# Patient Record
Sex: MALE | Race: WHITE | Employment: OTHER | URBAN - METROPOLITAN AREA
[De-identification: names, ages, dates, MRNs, and addresses within clinical notes are randomized per-mention and may not be internally consistent; named-entity substitution may affect disease eponyms.]

---

## 2017-07-24 ENCOUNTER — DOCUMENTATION ONLY (OUTPATIENT)
Dept: FAMILY MEDICINE CLINIC | Age: 77
End: 2017-07-24

## 2017-07-24 ENCOUNTER — OFFICE VISIT (OUTPATIENT)
Dept: FAMILY MEDICINE CLINIC | Age: 77
End: 2017-07-24

## 2017-07-24 VITALS
WEIGHT: 239.2 LBS | SYSTOLIC BLOOD PRESSURE: 138 MMHG | TEMPERATURE: 96.2 F | RESPIRATION RATE: 20 BRPM | BODY MASS INDEX: 35.43 KG/M2 | DIASTOLIC BLOOD PRESSURE: 84 MMHG | HEIGHT: 69 IN | HEART RATE: 64 BPM | OXYGEN SATURATION: 98 %

## 2017-07-24 DIAGNOSIS — R35.0 URINARY FREQUENCY: ICD-10-CM

## 2017-07-24 DIAGNOSIS — E66.01 MORBID OBESITY, UNSPECIFIED OBESITY TYPE (HCC): ICD-10-CM

## 2017-07-24 DIAGNOSIS — M54.50 ACUTE LOW BACK PAIN WITHOUT SCIATICA, UNSPECIFIED BACK PAIN LATERALITY: Primary | ICD-10-CM

## 2017-07-24 DIAGNOSIS — Z13.1 ENCOUNTER FOR SCREENING FOR DIABETES MELLITUS: ICD-10-CM

## 2017-07-24 LAB
BILIRUB UR QL STRIP: NEGATIVE
GLUCOSE DOSE-GTT, POCT, GLDSPOCT: 79
GLUCOSE UR-MCNC: NEGATIVE MG/DL
KETONES P FAST UR STRIP-MCNC: NEGATIVE MG/DL
PH UR STRIP: 6.5 [PH] (ref 4.6–8)
PROT UR QL STRIP: NEGATIVE MG/DL
SP GR UR STRIP: 1.02 (ref 1–1.03)
UA UROBILINOGEN AMB POC: NORMAL (ref 0.2–1)
URINALYSIS CLARITY POC: CLEAR
URINALYSIS COLOR POC: NORMAL
URINE BLOOD POC: NEGATIVE
URINE LEUKOCYTES POC: NEGATIVE
URINE NITRITES POC: NEGATIVE

## 2017-07-24 RX ORDER — METHYLPREDNISOLONE 16 MG/1
16 TABLET ORAL
COMMUNITY
Start: 2016-05-03 | End: 2022-07-27

## 2017-07-24 RX ORDER — ASPIRIN 81 MG/1
81 TABLET ORAL
COMMUNITY
Start: 2014-05-06

## 2017-07-24 RX ORDER — POLYETHYLENE GLYCOL 3350 17 G/17G
POWDER, FOR SOLUTION ORAL
COMMUNITY
Start: 2015-08-25 | End: 2022-07-27

## 2017-07-24 RX ORDER — LEVOTHYROXINE SODIUM 125 UG/1
TABLET ORAL
COMMUNITY
Start: 2017-07-19

## 2017-07-24 RX ORDER — VALACYCLOVIR HYDROCHLORIDE 500 MG/1
500 TABLET, FILM COATED ORAL
COMMUNITY
Start: 2016-05-03

## 2017-07-24 RX ORDER — BISMUTH SUBSALICYLATE 262 MG
1 TABLET,CHEWABLE ORAL
COMMUNITY

## 2017-07-24 NOTE — MR AVS SNAPSHOT
Visit Information Date & Time Provider Department Dept. Phone Encounter #  
 7/24/2017  1:00 PM Hayden Mclaughlin, [address] [phone] [id number] Follow-up Instructions Return in about 6 months (around 1/24/2018) for pending chart review. Follow-up and Disposition History Your Appointments 8/24/2017  1:00 PM  
Office Visit with Hayden Mclaughlin MD  
[address] [facility]) Appt Note: [address]   
 [facility] [address] [address]  
[phone]  
  
   
 [facility] [address] [address] Upcoming Health Maintenance Date Due DTaP/Tdap/Td series (1 - Tdap) 11/14/1963 FOBT Q 1 YEAR AGE 50-75 11/14/1992 ZOSTER VACCINE AGE 60> 9/14/2002 GLAUCOMA SCREENING Q2Y 11/14/2007 Pneumococcal 65+ Low/Medium Risk (1 of 2 - PCV13) 11/14/2007 MEDICARE YEARLY EXAM 11/14/2007 INFLUENZA AGE 9 TO ADULT 8/1/2017 Allergies as of 7/24/2017  Review Complete On: 7/24/2017 By: Jayne Mason LPN Severity Noted Reaction Type Reactions Niacin  01/11/2009    Other (comments) Other reaction(s): rash/hot flash Current Immunizations  Never Reviewed No immunizations on file. Not reviewed this visit You Were Diagnosed With   
  
 Codes Comments Acute low back pain without sciatica, unspecified back pain laterality    -  Primary ICD-10-CM: M54.5 ICD-9-CM: 724.2 Urinary frequency     ICD-10-CM: R35.0 ICD-9-CM: 788.41 Morbid obesity, unspecified obesity type ([facility] [address]     ICD-10-CM: E66.01 
ICD-9-CM: 278.01 Encounter for screening for diabetes mellitus     ICD-10-CM: Z13.1 ICD-9-CM: V77.1 Vitals BP Pulse Temp Resp Height(growth percentile) Weight(growth percentile) 138/84 64 96.2 °F (35.7 °C) (Oral) 20 5' 8.5\" (1.74 m) 239 lb 3.2 oz (108.5 kg) SpO2 BMI Smoking Status 98% 35.84 kg/m2 Former Smoker Vitals History BMI and BSA Data Body Mass Index Body Surface Area  
 35.84 kg/m 2 2.29 m 2 Your Updated Medication List  
  
   
This list is accurate as of: 7/24/17 11:59 PM.  Always use your most recent med list.  
  
  
  
  
 aspirin delayed-release 81 mg tablet Take 81 mg by mouth. CALTRATE 600 PO Take 600 mg by mouth.  
  
 methylPREDNISolone 16 mg tablet Commonly known as:  MEDROL Take 16 mg by mouth.  
  
 multivitamin tablet Commonly known as:  ONE A DAY Take 1 Tab by mouth.  
  
 polyethylene glycol 17 gram/dose powder Commonly known as:  Almeta Lauth Take  by mouth. SYNTHROID 125 mcg tablet Generic drug:  levothyroxine Indications: Underactive Thyroid. 1 PO once a day SYSTANE ULTRA OP Apply  to eye. TYLENOL EXTRA STRENGTH PO Take 1 Tab by mouth. valACYclovir 500 mg tablet Commonly known as:  VALTREX Take 500 mg by mouth. We Performed the Following AMB POC GLUCOSE TEST [58171 CPT(R)] AMB POC URINALYSIS DIP STICK AUTO W/ MICRO [63314 CPT(R)] AMB POC URINALYSIS DIP STICK MANUAL W/O MICRO [50195 CPT(R)] Follow-up Instructions Return in about 6 months (around 1/24/2018) for pending chart review. Patient Instructions Frequent Urination: Care Instructions Your Care Instructions An urge to urinate frequently but usually passing only small amounts of urine is a common symptom of urinary problems, such as urinary tract infections. The bladder may become inflamed. This can cause the urge to urinate. You may try to urinate more often than usual to try to soothe that urge. Frequent urination also may be caused by sexually transmitted infections (STIs) or kidney stones. Or it may happen when something irritates the tube that carries urine from the bladder to the outside of the body (urethra). It may also be a sign of diabetes. The cause may be hard to find. You may need tests. Follow-up care is a key part of your treatment and safety. Be sure to make and go to all appointments, and call your doctor if you are having problems. It's also a good idea to know your test results and keep a list of the medicines you take. How can you care for yourself at home? · Drink extra water for the next day or two. This will help make the urine less concentrated. (If you have kidney, heart, or liver disease and have to limit fluids, talk with your doctor before you increase the amount of fluids you drink.) · Avoid drinks that are carbonated or have caffeine. They can irritate the bladder. For women: · Urinate right after you have sex. · After you go to the bathroom, wipe from front to back. · Avoid douches, bubble baths, and feminine hygiene sprays. And avoid other feminine hygiene products that have deodorants. When should you call for help? Call your doctor now or seek immediate medical care if: 
· You have new symptoms, such as fever, nausea, or vomiting. · You have new or worse symptoms of a urinary problem. For example: ¨ You have blood or pus in your urine. ¨ You have chills or body aches. ¨ It hurts to urinate. ¨ You have groin or belly pain. ¨ You have pain in your back just below your rib cage (the flank area). Watch closely for changes in your health, and be sure to contact your doctor if you feel thirstier than usual. 
Where can you learn more? Go to http://marga-radha.info/. Enter 863 [id number] in the search box to learn more about \"Frequent Urination: Care Instructions. \" Current as of: May 5, 2017 Content Version: 11.3 © 7137-5683 [employer]. Care instructions adapted under license by [employer] (which disclaims liability or warranty for this information).  If you have questions about a medical condition or this instruction, always ask your healthcare professional. [employer] disclaims any warranty or liability for your use of this information. Introducing [facility]! Erinn Keen introduces FRAMED patient portal. Now you can access parts of your medical record, email your doctor's office, and request medication refills online. 1. In your internet browser, go to [email or web address] Jack Robie/"[employer]"t 2. Click on the First Time User? Click Here link in the Sign In box. You will see the New Member Sign Up page. 3. Enter your FRAMED Access Code exactly as it appears below. You will not need to use this code after youve completed the sign-up process. If you do not sign up before the expiration date, you must request a new code. · FRAMED Access Code: [email or web address] Expires: 10/29/2017  2:39 PM 
 
4. Enter the last four digits of your Social Security Number (xxxx) and Date of Birth (mm/dd/yyyy) as indicated and click Submit. You will be taken to the next sign-up page. 5. Create a FRAMED ID. This will be your FRAMED login ID and cannot be changed, so think of one that is secure and easy to remember. 6. Create a FRAMED password. You can change your password at any time. 7. Enter your Password Reset Question and Answer. This can be used at a later time if you forget your password. 8. Enter your e-mail address. You will receive e-mail notification when new information is available in [address] 9. Click Sign Up. You can now view and download portions of your medical record. 10. Click the Download Summary menu link to download a portable copy of your medical information. If you have questions, please visit the Frequently Asked Questions section of the FRAMED website. Remember, FRAMED is NOT to be used for urgent needs. For medical emergencies, dial 911. Now available from your iPhone and Android! Please provide this summary of care documentation to your next provider. Your primary care clinician is listed as Yonas Junior. If you have any questions after today's visit, please call [phone].

## 2017-07-24 NOTE — PROGRESS NOTES
Stacia Amato is a 78 y.o. male in today to establish care. Patient has no concerns at this time, but mentions ongoing back pain and numbness and tingling of right leg intermittently. Learning assessment previously completed; primary language is [place].

## 2017-07-24 NOTE — PATIENT INSTRUCTIONS
Frequent Urination: Care Instructions  Your Care Instructions  An urge to urinate frequently but usually passing only small amounts of urine is a common symptom of urinary problems, such as urinary tract infections. The bladder may become inflamed. This can cause the urge to urinate. You may try to urinate more often than usual to try to soothe that urge. Frequent urination also may be caused by sexually transmitted infections (STIs) or kidney stones. Or it may happen when something irritates the tube that carries urine from the bladder to the outside of the body (urethra). It may also be a sign of diabetes. The cause may be hard to find. You may need tests. Follow-up care is a key part of your treatment and safety. Be sure to make and go to all appointments, and call your doctor if you are having problems. It's also a good idea to know your test results and keep a list of the medicines you take. How can you care for yourself at home? · Drink extra water for the next day or two. This will help make the urine less concentrated. (If you have kidney, heart, or liver disease and have to limit fluids, talk with your doctor before you increase the amount of fluids you drink.)  · Avoid drinks that are carbonated or have caffeine. They can irritate the bladder. For women:  · Urinate right after you have sex. · After you go to the bathroom, wipe from front to back. · Avoid douches, bubble baths, and feminine hygiene sprays. And avoid other feminine hygiene products that have deodorants. When should you call for help? Call your doctor now or seek immediate medical care if:  · You have new symptoms, such as fever, nausea, or vomiting. · You have new or worse symptoms of a urinary problem. For example:  ¨ You have blood or pus in your urine. ¨ You have chills or body aches. ¨ It hurts to urinate. ¨ You have groin or belly pain. ¨ You have pain in your back just below your rib cage (the flank area).   Watch closely for changes in your health, and be sure to contact your doctor if you feel thirstier than usual.  Where can you learn more? Go to http://marga-radha.info/. Enter [phone] in the search box to learn more about \"Frequent Urination: Care Instructions. \"  Current as of: May 5, 2017  Content Version: 11.3  © 5391-0808 [employer]. Care instructions adapted under license by [employer] (which disclaims liability or warranty for this information). If you have questions about a medical condition or this instruction, always ask your healthcare professional. [place] [address] any warranty or liability for your use of this information.

## 2017-07-24 NOTE — PROGRESS NOTES
Establish Care        HPI: Bobby Josue is a 78 y.o. male WHITE OR  new patient here with hx of MUltiple Myeloma. Sees Dr Karl Ashton, oncology. Reports that he has been having some lower back pain over the last month as well as increased urinary frequency. He has a strong family hx of DM. He has not had recent screening. He had a recent bonescan by his oncologist that he reports was normal.            Past Medical History:   Diagnosis Date    Arthritis     Back pain     Calculus of kidney     Multiple myeloma ([facility] [address] 2010       Current Outpatient Prescriptions   Medication Sig    methylPREDNISolone (MEDROL) 16 mg tablet Take 16 mg by mouth.  valACYclovir (VALTREX) 500 mg tablet Take 500 mg by mouth.  aspirin delayed-release 81 mg tablet Take 81 mg by mouth.  CALCIUM CARBONATE (CALTRATE 600 PO) Take 600 mg by mouth.  polyethylene glycol (MIRALAX) 17 gram/dose powder Take  by mouth.  levothyroxine (SYNTHROID) 125 mcg tablet Indications: Underactive Thyroid. 1 PO once a day    ACETAMINOPHEN (TYLENOL EXTRA STRENGTH PO) Take 1 Tab by mouth.  multivitamin (ONE A DAY) tablet Take 1 Tab by mouth.  PROPYLENE GLYCOL/ (SYSTANE ULTRA OP) Apply  to eye. No current facility-administered medications for this visit. Allergies   Allergen Reactions    Niacin Other (comments)     Other reaction(s): rash/hot flash       Past Surgical History:   Procedure Laterality Date    HX CATARACT REMOVAL Bilateral 2017    HX THYROIDECTOMY         Family History   Problem Relation Age of Onset    Heart Disease Father     Heart Attack Father     Arthritis-osteo Sister     Asthma Maternal Grandmother     Bleeding Prob Maternal Grandmother     Lung Disease Sister        Social History     Social History    Marital status:      Spouse name: N/A    Number of children: N/A    Years of education: N/A     Occupational History    Not on file.      Social History Main Topics    Smoking status: Former Smoker     Types: Cigarettes     Quit date: 1/1/1983    Smokeless tobacco: Never Used    Alcohol use No    Drug use: No    Sexual activity: Not on file     Other Topics Concern    Not on file     Social History Narrative    No narrative on file       Review of Systems   Constitutional: Negative for chills, fever and weight loss. Respiratory: Negative for cough and shortness of breath. Cardiovascular: Negative for chest pain and palpitations. Genitourinary: Positive for frequency. Negative for dysuria, flank pain, hematuria and urgency. Musculoskeletal: Positive for back pain. Skin: Negative for rash. Visit Vitals    /84    Pulse 64    Temp 96.2 °F (35.7 °C) (Oral)    Resp 20    Ht 5' 8.5\" (1.74 m)    Wt 239 lb 3.2 oz (108.5 kg)    SpO2 98%    BMI 35.84 kg/m2       Physical Exam   Constitutional: He is oriented to person, place, and time. He appears well-developed and well-nourished. No distress. HENT:   Head: Normocephalic and atraumatic. Right Ear: External ear normal.   Left Ear: External ear normal.   Cardiovascular: Normal rate, regular rhythm and normal heart sounds. Exam reveals no friction rub. No murmur heard. Pulmonary/Chest: Effort normal and breath sounds normal. No respiratory distress. He has no wheezes. He has no rales. Musculoskeletal:        Lumbar back: He exhibits no tenderness, no bony tenderness and no spasm. Neurological: He is alert and oriented to person, place, and time. No cranial nerve deficit. Skin: Skin is warm and dry. He is not diaphoretic. Psychiatric: He has a normal mood and affect. His behavior is normal. Thought content normal.   Nursing note and vitals reviewed.     Results for orders placed or performed in visit on 07/24/17   AMB POC URINALYSIS DIP STICK AUTO W/ MICRO   Result Value Ref Range    Color (UA POC) Dark Yellow     Clarity (UA POC) Clear     Glucose (UA POC) Negative Negative    Bilirubin (UA POC) Negative Negative    Ketones (UA POC) Negative Negative    Specific gravity (UA POC) 1.020 1.001 - 1.035    Blood (UA POC) Negative Negative    pH (UA POC) 6.5 4.6 - 8.0    Protein (UA POC) Negative Negative mg/dL    Urobilinogen (UA POC) 0.2 mg/dL 0.2 - 1    Nitrites (UA POC) Negative Negative    Leukocyte esterase (UA POC) Negative Negative   AMB POC GLUCOSE TEST   Result Value Ref Range    Glucose dose-GTT 79            Assesment:  1. Acute low back pain without sciatica, unspecified back pain laterality  Likely muscle strain. Tylenol PRN and stretching exercises  - AMB POC URINALYSIS DIP STICK AUTO W/ MICRO    2. Urinary frequency    - AMB POC GLUCOSE TEST    3. Morbid obesity, unspecified obesity type ([address]  I have reviewed/discussed the above normal BMI with the patient. I have recommended the following interventions: encourage exercise . Edinson Stewart - AMB POC GLUCOSE TEST    4. Encounter for screening for diabetes mellitus     - AMB POC GLUCOSE TEST          I have discussed the diagnosis with the patient and the intended management  The patient has received an after-visit summary and questions were answered concerning future plans. I have discussed medication usage, side effects and warnings with the patient as well. I have reviewed the plan of care with the patient, accepted their input and they are in agreement with the treatment goals. Follow-up Disposition:  Return in about 6 months (around 1/24/2018) for pending chart review.       Gisela Castellanos MD                .

## 2017-08-24 ENCOUNTER — OFFICE VISIT (OUTPATIENT)
Dept: FAMILY MEDICINE CLINIC | Age: 77
End: 2017-08-24

## 2017-08-24 ENCOUNTER — TELEPHONE (OUTPATIENT)
Dept: FAMILY MEDICINE CLINIC | Age: 77
End: 2017-08-24

## 2017-08-24 VITALS
HEART RATE: 67 BPM | WEIGHT: 236.6 LBS | OXYGEN SATURATION: 97 % | DIASTOLIC BLOOD PRESSURE: 90 MMHG | BODY MASS INDEX: 35.04 KG/M2 | RESPIRATION RATE: 18 BRPM | HEIGHT: 69 IN | TEMPERATURE: 98.6 F | SYSTOLIC BLOOD PRESSURE: 138 MMHG

## 2017-08-24 DIAGNOSIS — Z13.39 SCREENING FOR ALCOHOLISM: ICD-10-CM

## 2017-08-24 DIAGNOSIS — Z13.31 SCREENING FOR DEPRESSION: ICD-10-CM

## 2017-08-24 DIAGNOSIS — Z00.00 INITIAL MEDICARE ANNUAL WELLNESS VISIT: Primary | ICD-10-CM

## 2017-08-24 DIAGNOSIS — R09.82 POSTNASAL DRIP: ICD-10-CM

## 2017-08-24 DIAGNOSIS — R09.81 NASAL CONGESTION: ICD-10-CM

## 2017-08-24 DIAGNOSIS — Z23 ENCOUNTER FOR IMMUNIZATION: ICD-10-CM

## 2017-08-24 PROBLEM — G47.33 OSA (OBSTRUCTIVE SLEEP APNEA): Status: ACTIVE | Noted: 2017-08-24

## 2017-08-24 PROBLEM — C90.01 MULTIPLE MYELOMA IN REMISSION (HCC): Status: ACTIVE | Noted: 2017-08-24

## 2017-08-24 NOTE — TELEPHONE ENCOUNTER
Dr. Anne-Marie Badillo would like to know if Dr. Katherine Osullivan has any recommendations on Pneumovax for the pt since he is on chemo; For instance, every 5 years? Left message on the nurse line with this question. Waiting for a call back.

## 2017-08-24 NOTE — PATIENT INSTRUCTIONS

## 2017-08-24 NOTE — PROGRESS NOTES
This is an Initial Medicare Annual Wellness Exam (AWV) (Performed 12 months after IPPE or effective date of Medicare Part B enrollment, Once in a lifetime)    I have reviewed the patient's medical history in detail and updated the computerized patient record. History   Having nasal congestion and postnasal drip. Not too concerned with this. Also having some joint pains, secondary to arthritis but this too is nothing he is concerned with. Past Medical History:   Diagnosis Date    Arthritis     Back pain     Calculus of kidney     Multiple myeloma ([facility] [address] 2010      Past Surgical History:   Procedure Laterality Date    HX CATARACT REMOVAL Bilateral 2017    HX THYROIDECTOMY       Current Outpatient Prescriptions   Medication Sig Dispense Refill    methylPREDNISolone (MEDROL) 16 mg tablet Take 16 mg by mouth.  valACYclovir (VALTREX) 500 mg tablet Take 500 mg by mouth three (3) days a week.  aspirin delayed-release 81 mg tablet Take 81 mg by mouth.  CALCIUM CARBONATE (CALTRATE 600 PO) Take 600 mg by mouth.  multivitamin (ONE A DAY) tablet Take 1 Tab by mouth.  PROPYLENE GLYCOL/ (SYSTANE ULTRA OP) Apply  to eye.  polyethylene glycol (MIRALAX) 17 gram/dose powder Take  by mouth.  levothyroxine (SYNTHROID) 125 mcg tablet Indications: Underactive Thyroid. 1 PO once a day      ACETAMINOPHEN (TYLENOL EXTRA STRENGTH PO) Take 1 Tab by mouth.        Allergies   Allergen Reactions    Niacin Other (comments)     Other reaction(s): rash/hot flash     Family History   Problem Relation Age of Onset    Heart Disease Father     Heart Attack Father     Arthritis-osteo Sister     Asthma Maternal Grandmother     Bleeding Prob Maternal Grandmother     Lung Disease Sister      Social History   Substance Use Topics    Smoking status: Former Smoker     Types: Cigarettes     Quit date: 1/1/1983    Smokeless tobacco: Never Used    Alcohol use No     Patient Active Problem List Diagnosis Code    Acute low back pain without sciatica M54.5    Urinary frequency R35.0       Depression Risk Factor Screening:     PHQ over the last two weeks 7/24/2017   Little interest or pleasure in doing things Not at all   Feeling down, depressed or hopeless Not at all   Total Score PHQ 2 0     Alcohol Risk Factor Screening: You do not drink alcohol or very rarely. Functional Ability and Level of Safety:     Hearing Loss  The patient wears hearing aids. Activities of Daily Living  The home contains: no safety equipment  Patient does total self care    Fall RiskFall Risk Assessment, last 12 mths 8/24/2017   Able to walk? Yes   Fall in past 12 months? Yes   Fall with injury? Yes   Number of falls in past 12 months 1   Fall Risk Score 2       Abuse Screen  Patient is not abused    Cognitive Screening   Evaluation of Cognitive Function:  Has your family/caregiver stated any concerns about your memory: no  Normal    Patient Care Team   Patient Care Team:  Saji Enriquez MD as PCP - General (Family Practice)  Nia Lantigua MD as Physician [facility])    Advice/Referrals/Counseling   Education and counseling provided:  Are appropriate based on today's review and evaluation  End-of-Life planning (with patient's consent)  Influenza Vaccine     Advance Care Planning (ACP) Provider Note - Comprehensive     Date of ACP Conversation: 8/24/2017  Persons included in Conversation:  patient  Length of ACP Conversation in minutes:  16 minutes    Authorized Decision Maker (if patient is incapable of making informed decisions):    This person is:  Healthcare Agent/Medical Power of  under Advance Directive          General ACP for ALL Patients with Decision Making Capacity:   Importance of advance care planning, including choosing a healthcare agent to communicate patient's healthcare decisions if patient lost the ability to make decisions, such as after a sudden illness or accident    Review of Existing Advance Directive:  N/A.  given application previously. he reports he does have it at home    For Serious or Chronic Illness:  Understanding of medical condition    Understanding of CPR, goals and expected outcomes, benefits and burdens discussed. Interventions Provided:  Recommended completion of Advance Directive form after review of ACP materials and conversation with prospective healthcare agent       Assessment/Plan   1. Initial Medicare annual wellness visit      2. Screening for depression    - Depression Screen Annual    3. Screening for alcoholism    - Annual  Alcohol Screen 15 min ()    4. Nasal congestion  Patient states not really bothering him much. Recommended antihistamine    5. Postnasal drip      6. Encounter for immunization    - INFLUENZA VIRUS VACCINE, HIGH DOSE SEASONAL, PRESERVATIVE FREE    Will check with oncologist as he likely needs pneumonia vaccination.       Health Maintenance Due   Topic Date Due    DTaP/Tdap/Td series (1 - Tdap) 11/14/1963    FOBT Q 1 YEAR AGE 50-75  11/14/1992    ZOSTER VACCINE AGE 60>  09/14/2002    GLAUCOMA SCREENING Q2Y  11/14/2007    Pneumococcal 65+ Low/Medium Risk (1 of 2 - PCV13) 11/14/2007    MEDICARE YEARLY EXAM  11/14/2007    INFLUENZA AGE 9 TO ADULT  08/01/2017

## 2017-08-24 NOTE — PROGRESS NOTES
Candida Jones is a 78 y.o. male here for [address] He is c/o L hip pain, no injury. PHQ over the last two weeks 7/24/2017   Little interest or pleasure in doing things Not at all   Feeling down, depressed or hopeless Not at all   Total Score PHQ 2 0     Fall Risk Assessment, last 12 mths 8/24/2017   Able to walk? Yes   Fall in past 12 months? Yes   Fall with injury? Yes   Number of falls in past 12 months 1   Fall Risk Score 2     Abuse Screening Questionnaire 8/24/2017   Do you ever feel afraid of your partner? N   Are you in a relationship with someone who physically or mentally threatens you? N   Is it safe for you to go home?  Y     ADL Assessment 8/24/2017   Feeding yourself No Help Needed   Getting from bed to chair No Help Needed   Getting dressed No Help Needed   Bathing or showering No Help Needed   Walk across the room (includes cane/walker) No Help Needed   Using the telphone No Help Needed   Taking your medications No Help Needed   Preparing meals No Help Needed   Managing money (expenses/bills) No Help Needed   Moderately strenuous housework (laundry) No Help Needed   Shopping for personal items (toiletries/medicines) No Help Needed   Shopping for groceries No Help Needed   Driving No Help Needed   Climbing a flight of stairs No Help Needed   Getting to places beyond walking distances No Help Needed

## 2017-08-25 NOTE — TELEPHONE ENCOUNTER
Return call from Dr. Quintero Postal office. They would like for pt to get Pneumovax every 5 years. Will relay this info to Dr. Karli Fraga.

## 2017-08-28 NOTE — TELEPHONE ENCOUNTER
Can you please call Mr Scott Jimenez and let him know he should come in for Pnuemovax 23 please.     Thanks,  [phone] Rico MD

## 2017-08-29 NOTE — TELEPHONE ENCOUNTER
Patient states he recently got Pneumovax from [facility] before coming here. [facility] him records have been requested and we will check into this.

## 2017-09-01 NOTE — PROGRESS NOTES
ADDENDUM TO CODIN is incorrect for procedure performed. [place] 0[address]. Updating billing in [place] for correct procedure code per equipment used at the practice.

## 2018-01-08 ENCOUNTER — OFFICE VISIT (OUTPATIENT)
Dept: FAMILY MEDICINE CLINIC | Age: 78
End: 2018-01-08

## 2018-01-08 VITALS
HEIGHT: 69 IN | RESPIRATION RATE: 18 BRPM | WEIGHT: 238.2 LBS | DIASTOLIC BLOOD PRESSURE: 85 MMHG | HEART RATE: 106 BPM | TEMPERATURE: 98.8 F | SYSTOLIC BLOOD PRESSURE: 144 MMHG | BODY MASS INDEX: 35.28 KG/M2 | OXYGEN SATURATION: 95 %

## 2018-01-08 DIAGNOSIS — J06.9 UPPER RESPIRATORY TRACT INFECTION, UNSPECIFIED TYPE: Primary | ICD-10-CM

## 2018-01-08 DIAGNOSIS — R06.2 WHEEZING: ICD-10-CM

## 2018-01-08 RX ORDER — BENZONATATE 100 MG/1
100 CAPSULE ORAL
Qty: 20 CAP | Refills: 0 | Status: SHIPPED | OUTPATIENT
Start: 2018-01-08 | End: 2018-01-15

## 2018-01-08 RX ORDER — GUAIFENESIN 600 MG/1
600 TABLET, EXTENDED RELEASE ORAL 2 TIMES DAILY
Qty: 30 TAB | Refills: 0 | Status: SHIPPED | OUTPATIENT
Start: 2018-01-08 | End: 2022-07-27

## 2018-01-08 NOTE — MR AVS SNAPSHOT
Visit Information Date & Time Provider Department Dept. Phone Encounter #  
 1/8/2018  1:45 PM Sudhir Paz, [address] [phone] [id number] Follow-up Instructions Return if symptoms worsen or fail to improve. Upcoming Health Maintenance Date Due DTaP/Tdap/Td series (1 - Tdap) 11/14/1961 MEDICARE YEARLY EXAM 8/25/2018 GLAUCOMA SCREENING Q2Y 7/28/2019 Allergies as of 1/8/2018  Review Complete On: 1/8/2018 By: Asad Gill Severity Noted Reaction Type Reactions Niacin  01/11/2009    Other (comments) Other reaction(s): rash/hot flash Current Immunizations  Reviewed on 8/24/2017 Name Date Influenza High Dose Vaccine PF 8/24/2017  2:18 PM, 9/30/2016, 10/9/2015, 9/18/2014, 9/10/2013, 11/6/2012 Influenza Vaccine 11/8/2011, 9/24/2010, 10/13/2009, 10/28/2008 Pneumococcal Conjugate (PCV-13) 1/26/2015 Pneumococcal Polysaccharide (PPSV-23) 9/10/2013, 12/14/2006 Zoster Vaccine, Live 2/23/2009 Not reviewed this visit You Were Diagnosed With   
  
 Codes Comments Upper respiratory tract infection, unspecified type    -  Primary ICD-10-CM: J06.9 ICD-9-CM: 465.9 Wheezing     ICD-10-CM: R06.2 ICD-9-CM: 786.07 Vitals BP Pulse Temp Resp Height(growth percentile) Weight(growth percentile) 144/85 (BP 1 Location: Left arm, BP Patient Position: Sitting) (!) 106 98.8 °F (37.1 °C) (Oral) 18 5' 8.5\" (1.74 m) 238 lb 3.2 oz (108 kg) SpO2 BMI Smoking Status 95% 35.69 kg/m2 Former Smoker Vitals History BMI and BSA Data Body Mass Index Body Surface Area  
 35.69 kg/m 2 2.28 m 2 Preferred Pharmacy Pharmacy Name Phone [address] [phone] Your Updated Medication List  
  
   
This list is accurate as of: 1/8/18  2:21 PM.  Always use your most recent med list.  
  
  
  
  
 aspirin delayed-release 81 mg tablet Take 81 mg by mouth.  
  
 benzonatate 100 mg capsule Commonly known as:  TESSALON PERLES Take 1 Cap by mouth three (3) times daily as needed for Cough for up to 7 days. CALTRATE 600 PO Take 600 mg by mouth.  
  
 guaiFENesin  mg ER tablet Commonly known as:  [employer] Take 1 Tab by mouth two (2) times a day. methylPREDNISolone 16 mg tablet Commonly known as:  MEDROL Take 16 mg by mouth.  
  
 multivitamin tablet Commonly known as:  ONE A DAY Take 1 Tab by mouth.  
  
 polyethylene glycol 17 gram/dose powder Commonly known as:  [place] Linear Take  by mouth. SYNTHROID 125 mcg tablet Generic drug:  levothyroxine Indications: Underactive Thyroid. 1 PO once a day SYSTANE ULTRA OP Apply  to eye. TYLENOL EXTRA STRENGTH PO Take 1 Tab by mouth. valACYclovir 500 mg tablet Commonly known as:  VALTREX Take 500 mg by mouth three (3) days a week. Prescriptions Sent to Pharmacy Refills  
 benzonatate (TESSALON PERLES) 100 mg capsule 0 Sig: Take 1 Cap by mouth three (3) times daily as needed for Cough for up to 7 days. Class: Normal  
 Pharmacy: [facility] [address] [address] [place] [address] Ph #: [phone] Route: Oral  
 guaiFENesin ER (MUCINEX) 600 mg ER tablet 0 Sig: Take 1 Tab by mouth two (2) times a day. Class: Normal  
 Pharmacy: [facility] [address] [address] [place] [address] Ph #: [phone] Route: Oral  
  
Follow-up Instructions Return if symptoms worsen or fail to improve. Patient Instructions Please make sure to get lots of rest, drink plenty of fluids at least eight 8-ounce glasses daily. Please start Vitamin C supplement of 1000mg daily and a daily multivitamin with zinc in it or a zinc supplementation.  Use warm mist vaporizer/ humidifier and Vicks vaporub around the nose to help open up your nasal passages. Use mucinex for cough. Use albuterol inhaler for wheezing. Use ibuprofen as needed for pain and fevers. Please follow up if you are not improving in 1 week or if your symptoms change. Upper Respiratory Infection (Cold): Care Instructions Your Care Instructions An upper respiratory infection, or URI, is an infection of the nose, sinuses, or throat. URIs are spread by coughs, sneezes, and direct contact. The common cold is the most frequent kind of URI. The flu and sinus infections are other kinds of URIs. Almost all URIs are caused by viruses. Antibiotics won't cure them. But you can treat most infections with home care. This may include drinking lots of fluids and taking over-the-counter pain medicine. You will probably feel better in 4 to 10 days. The doctor has checked you carefully, but problems can develop later. If you notice any problems or new symptoms, get medical treatment right away. Follow-up care is a key part of your treatment and safety. Be sure to make and go to all appointments, and call your doctor if you are having problems. It's also a good idea to know your test results and keep a list of the medicines you take. How can you care for yourself at home? · To prevent dehydration, drink plenty of fluids, enough so that your urine is light yellow or clear like water. Choose water and other caffeine-free clear liquids until you feel better. If you have kidney, heart, or liver disease and have to limit fluids, talk with your doctor before you increase the amount of fluids you drink. · Take an over-the-counter pain medicine, such as acetaminophen (Tylenol), ibuprofen (Advil, Motrin), or naproxen (Aleve). Read and follow all instructions on the label. · Before you use cough and cold medicines, check the label. These medicines may not be safe for young children or for people with certain health problems. · Be careful when taking over-the-counter cold or flu medicines and Tylenol at the same time. Many of these medicines have acetaminophen, which is Tylenol. Read the labels to make sure that you are not taking more than the recommended dose. Too much acetaminophen (Tylenol) can be harmful. · Get plenty of rest. 
· Do not smoke or allow others to smoke around you. If you need help quitting, talk to your doctor about stop-smoking programs and medicines. These can increase your chances of quitting for good. When should you call for help? Call 911 anytime you think you may need emergency care. For example, call if: 
? · You have severe trouble breathing. ?Call your doctor now or seek immediate medical care if: 
? · You seem to be getting much sicker. ? · You have new or worse trouble breathing. ? · You have a new or higher fever. ? · You have a new rash. ? Watch closely for changes in your health, and be sure to contact your doctor if: 
? · You have a new symptom, such as a sore throat, an earache, or sinus pain. ? · You cough more deeply or more often, especially if you notice more mucus or a change in the color of your mucus. ? · You do not get better as expected. Where can you learn more? Go to http://marga-radha.info/. Enter U094 in the search box to learn more about \"Upper Respiratory Infection (Cold): Care Instructions. \" Current as of: May 12, 2017 Content Version: 11.4 © 9870-8644 [employer]. Care instructions adapted under license by [employer] (which disclaims liability or warranty for this information). If you have questions about a medical condition or this instruction, always ask your healthcare professional. [address] any warranty or liability for your use of this information. Introducing [facility] & HEALTH SERVICES! Dear Radha Ortiz: Thank you for requesting a [employer] account.   Our records indicate that you already have an active VuCOMP account. You can access your account anytime at [email or web address]. [email or web address] Did you know that you can access your hospital and ER discharge instructions at any time in VuCOMP? You can also review all of your test results from your hospital stay or ER visit. Additional Information If you have questions, please visit the Frequently Asked Questions section of the VuCOMP website at [email or web address] Remember, VuCOMP is NOT to be used for urgent needs. For medical emergencies, dial 911. Now available from your iPhone and Android! Please provide this summary of care documentation to your next provider. Your primary care clinician is listed as Chantel Painting. If you have any questions after today's visit, please call [phone].

## 2018-01-08 NOTE — PATIENT INSTRUCTIONS
Please make sure to get lots of rest, drink plenty of fluids at least eight 8-ounce glasses daily. Please start Vitamin C supplement of 1000mg daily and a daily multivitamin with zinc in it or a zinc supplementation. Use warm mist vaporizer/ humidifier and Vicks vaporub around the nose to help open up your nasal passages. Use mucinex for cough. Use albuterol inhaler for wheezing. Use ibuprofen as needed for pain and fevers. Please follow up if you are not improving in 1 week or if your symptoms change. Upper Respiratory Infection (Cold): Care Instructions  Your Care Instructions    An upper respiratory infection, or URI, is an infection of the nose, sinuses, or throat. URIs are spread by coughs, sneezes, and direct contact. The common cold is the most frequent kind of URI. The flu and sinus infections are other kinds of URIs. Almost all URIs are caused by viruses. Antibiotics won't cure them. But you can treat most infections with home care. This may include drinking lots of fluids and taking over-the-counter pain medicine. You will probably feel better in 4 to 10 days. The doctor has checked you carefully, but problems can develop later. If you notice any problems or new symptoms, get medical treatment right away. Follow-up care is a key part of your treatment and safety. Be sure to make and go to all appointments, and call your doctor if you are having problems. It's also a good idea to know your test results and keep a list of the medicines you take. How can you care for yourself at home? · To prevent dehydration, drink plenty of fluids, enough so that your urine is light yellow or clear like water. Choose water and other caffeine-free clear liquids until you feel better. If you have kidney, heart, or liver disease and have to limit fluids, talk with your doctor before you increase the amount of fluids you drink.   · Take an over-the-counter pain medicine, such as acetaminophen (Tylenol), ibuprofen (Advil, Motrin), or naproxen (Aleve). Read and follow all instructions on the label. · Before you use cough and cold medicines, check the label. These medicines may not be safe for young children or for people with certain health problems. · Be careful when taking over-the-counter cold or flu medicines and Tylenol at the same time. Many of these medicines have acetaminophen, which is Tylenol. Read the labels to make sure that you are not taking more than the recommended dose. Too much acetaminophen (Tylenol) can be harmful. · Get plenty of rest.  · Do not smoke or allow others to smoke around you. If you need help quitting, talk to your doctor about stop-smoking programs and medicines. These can increase your chances of quitting for good. When should you call for help? Call 911 anytime you think you may need emergency care. For example, call if:  ? · You have severe trouble breathing. ?Call your doctor now or seek immediate medical care if:  ? · You seem to be getting much sicker. ? · You have new or worse trouble breathing. ? · You have a new or higher fever. ? · You have a new rash. ? Watch closely for changes in your health, and be sure to contact your doctor if:  ? · You have a new symptom, such as a sore throat, an earache, or sinus pain. ? · You cough more deeply or more often, especially if you notice more mucus or a change in the color of your mucus. ? · You do not get better as expected. Where can you learn more? Go to http://marga-radha.info/. Enter J199 in the search box to learn more about \"Upper Respiratory Infection (Cold): Care Instructions. \"  Current as of: May 12, 2017  Content Version: 11.4  © 7651-6137 [employer]. Care instructions adapted under license by [employer]It (which disclaims liability or warranty for this information).  If you have questions about a medical condition or this instruction, always ask your healthcare professional. [address] any warranty or liability for your use of this information.

## 2018-01-08 NOTE — PROGRESS NOTES
Chief Complaint   Patient presents with    Cold Symptoms     c/o productive cough greenish in color, fever, chills sinus congestion and weezing times one week     1. Have you been to the ER, urgent care clinic since your last visit? Hospitalized since your last visit? No    2. Have you seen or consulted any other health care providers outside of the [address] [place] [address] since your last visit? Include any pap smears or colon screening. Yes, oncologist Dr. Hayden Kline.

## 2018-01-09 NOTE — PROGRESS NOTES
Cold Symptoms (c/o productive cough greenish in color, fever, chills sinus congestion and weezing times one week)        SUBJECTIVE:   Jamel Zazueta is a 78 y.o. male who complains of productive cough and wheezing for 7 days. He denies a history of fevers and shortness of breath and does not a history of asthma. Patient does not smoke cigarettes. Past Medical History:   Diagnosis Date    Arthritis     Back pain     Calculus of kidney     Multiple myeloma ([facility] [address] 2010       Current Outpatient Prescriptions   Medication Sig    benzonatate (TESSALON PERLES) 100 mg capsule Take 1 Cap by mouth three (3) times daily as needed for Cough for up to 7 days.  guaiFENesin ER (MUCINEX) 600 mg ER tablet Take 1 Tab by mouth two (2) times a day.  methylPREDNISolone (MEDROL) 16 mg tablet Take 16 mg by mouth.  valACYclovir (VALTREX) 500 mg tablet Take 500 mg by mouth three (3) days a week.  aspirin delayed-release 81 mg tablet Take 81 mg by mouth.  CALCIUM CARBONATE (CALTRATE 600 PO) Take 600 mg by mouth.  multivitamin (ONE A DAY) tablet Take 1 Tab by mouth.  PROPYLENE GLYCOL/ (SYSTANE ULTRA OP) Apply  to eye.  polyethylene glycol (MIRALAX) 17 gram/dose powder Take  by mouth.  levothyroxine (SYNTHROID) 125 mcg tablet Indications: Underactive Thyroid. 1 PO once a day    ACETAMINOPHEN (TYLENOL EXTRA STRENGTH PO) Take 1 Tab by mouth. No current facility-administered medications for this visit.         Allergies   Allergen Reactions    Niacin Other (comments)     Other reaction(s): rash/hot flash       Past Surgical History:   Procedure Laterality Date    HX CATARACT REMOVAL Bilateral 2017    HX THYROIDECTOMY         Family History   Problem Relation Age of Onset    Heart Disease Father     Heart Attack Father     Arthritis-osteo Sister     Asthma Maternal Grandmother     Bleeding Prob Maternal Grandmother     Lung Disease Sister        Social History     Social History    Marital status:      Spouse name: N/A    Number of children: N/A    Years of education: N/A     Occupational History    Not on file. Social History Main Topics    Smoking status: Former Smoker     Types: Cigarettes     Quit date: 1/1/1983    Smokeless tobacco: Never Used    Alcohol use No    Drug use: No    Sexual activity: Not on file     Other Topics Concern    Not on file     Social History Narrative       Constitutional: Negative for fever and chills. Respiratory: Positive for cough, congestion. Negative for shortness of breath and wheezing. Cardiovascular: Negative for chest pain. Genitourinary: Negative for dysuria, urgency and frequency. Musculoskeletal: Negative for joint pain. Skin: Negative for rash. The following portions of the patient's history were reviewed and updated as appropriate: allergies, current medications, past medical history, past surgical history and problem list.    OBJECTIVE:  Visit Vitals    /85 (BP 1 Location: Left arm, BP Patient Position: Sitting)    Pulse (!) 106    Temp 98.8 °F (37.1 °C) (Oral)    Resp 18    Ht 5' 8.5\" (1.74 m)    Wt 238 lb 3.2 oz (108 kg)    SpO2 95%    BMI 35.69 kg/m2     He appears well, vital signs are as noted. Ears normal.  Throat and pharynx normal.  Neck supple. No adenopathy in the neck. Nose is congested. Sinuses non tender. The chest is +wheezes, no rales or rhonchi. Heart RRR, nL S1, S2, no murmurs    Lab Results from today's visit:  No results found for this or any previous visit (from the past 4 hour(s)).   Results for orders placed or performed in visit on 07/24/17   AMB POC URINALYSIS DIP STICK AUTO W/ MICRO   Result Value Ref Range    Color (UA POC) Dark Yellow     Clarity (UA POC) Clear     Glucose (UA POC) Negative Negative    Bilirubin (UA POC) Negative Negative    Ketones (UA POC) Negative Negative    Specific gravity (UA POC) 1.020 1.001 - 1.035    Blood (UA POC) Negative Negative    pH (UA POC) 6.5 4.6 - 8.0    Protein (UA POC) Negative Negative mg/dL    Urobilinogen (UA POC) 0.2 mg/dL 0.2 - 1    Nitrites (UA POC) Negative Negative    Leukocyte esterase (UA POC) Negative Negative   AMB POC GLUCOSE TEST   Result Value Ref Range    Glucose dose-GTT 79          ASSESSMENT:     ICD-10-CM ICD-9-CM    1. Upper respiratory tract infection, unspecified type J06.9 465.9 benzonatate (TESSALON PERLES) 100 mg capsule      guaiFENesin ER (MUCINEX) 600 mg ER tablet   2. Wheezing R06.2 786.07 benzonatate (TESSALON PERLES) 100 mg capsule       Please make sure to get lots of rest, drink plenty of fluids at least eight 8-ounce glasses daily. Please start Vitamin C supplement of 1000mg daily and a daily multivitamin with zinc in it or a zinc supplementation. Use warm mist vaporizer/ humidifier and Vicks vaporub around the nose to help open up your nasal passages. Use mucinex for cough. Use albuterol inhaler for wheezing. Use ibuprofen as needed for pain and fevers. Please follow up if you are not improving in 1 week or if your symptoms change.     Oh Martel MD

## 2018-05-16 ENCOUNTER — OFFICE VISIT (OUTPATIENT)
Dept: FAMILY MEDICINE CLINIC | Age: 78
End: 2018-05-16

## 2018-05-16 VITALS
DIASTOLIC BLOOD PRESSURE: 83 MMHG | TEMPERATURE: 97.4 F | RESPIRATION RATE: 18 BRPM | HEART RATE: 96 BPM | BODY MASS INDEX: 35.22 KG/M2 | WEIGHT: 237.8 LBS | SYSTOLIC BLOOD PRESSURE: 109 MMHG | OXYGEN SATURATION: 97 % | HEIGHT: 69 IN

## 2018-05-16 DIAGNOSIS — N20.0 RENAL STONE: Primary | ICD-10-CM

## 2018-05-16 DIAGNOSIS — R31.0 GROSS HEMATURIA: ICD-10-CM

## 2018-05-16 PROBLEM — E66.01 SEVERE OBESITY (BMI 35.0-39.9) WITH COMORBIDITY (HCC): Status: ACTIVE | Noted: 2018-05-16

## 2018-05-16 LAB
BILIRUB UR QL STRIP: NEGATIVE
GLUCOSE UR-MCNC: NEGATIVE MG/DL
KETONES P FAST UR STRIP-MCNC: NEGATIVE MG/DL
PH UR STRIP: 6.5 [PH] (ref 4.6–8)
PROT UR QL STRIP: NEGATIVE
SP GR UR STRIP: 1.02 (ref 1–1.03)
UA UROBILINOGEN AMB POC: NORMAL (ref 0.2–1)
URINALYSIS CLARITY POC: NORMAL
URINALYSIS COLOR POC: NORMAL
URINE BLOOD POC: NORMAL
URINE LEUKOCYTES POC: NEGATIVE
URINE NITRITES POC: NEGATIVE

## 2018-05-16 NOTE — PROGRESS NOTES
Kidney Stone (follow up)        HPI: Kelsea Mcnulty is a 78 y.o. male [facility] here with blood in urine last Wednesday. Seen at [facility] [address] and CT scan indicated renal stone on the right midway thru the ureter he reports. He was sent home on flomax which he has been using. He has never had any renal/abdominal pain during this episode,he has not had recurrence of blood in urine, denies back pain or fevers. He does have hx of right renal stone a few years ago that was treated with laser lithotripsy. Has not had any problems since        Past Medical History:   Diagnosis Date    Arthritis     Back pain     Calculus of kidney     Multiple myeloma ([facility] [address] 2010       Current Outpatient Prescriptions   Medication Sig    DARATUMUMAB IV 6,880 mg by IntraVENous route every four (4) weeks.  guaiFENesin ER (MUCINEX) 600 mg ER tablet Take 1 Tab by mouth two (2) times a day.  methylPREDNISolone (MEDROL) 16 mg tablet Take 16 mg by mouth.  valACYclovir (VALTREX) 500 mg tablet Take 500 mg by mouth three (3) days a week.  aspirin delayed-release 81 mg tablet Take 81 mg by mouth.  CALCIUM CARBONATE (CALTRATE 600 PO) Take 600 mg by mouth.  multivitamin (ONE A DAY) tablet Take 1 Tab by mouth.  PROPYLENE GLYCOL/ (SYSTANE ULTRA OP) Apply  to eye.  polyethylene glycol (MIRALAX) 17 gram/dose powder Take  by mouth.  levothyroxine (SYNTHROID) 125 mcg tablet Indications: Underactive Thyroid. 1 PO once a day    ACETAMINOPHEN (TYLENOL EXTRA STRENGTH PO) Take 1 Tab by mouth. No current facility-administered medications for this visit.         Allergies   Allergen Reactions    Niacin Other (comments)     Other reaction(s): rash/hot flash       Past Surgical History:   Procedure Laterality Date    HX CATARACT REMOVAL Bilateral 2017    HX THYROIDECTOMY         Family History   Problem Relation Age of Onset    Heart Disease Father     Heart Attack Father     Arthritis-osteo Sister     Asthma Maternal Grandmother     Bleeding Prob Maternal Grandmother     Lung Disease Sister        Social History     Social History    Marital status:      Spouse name: N/A    Number of children: N/A    Years of education: N/A     Occupational History    Not on file. Social History Main Topics    Smoking status: Former Smoker     Types: Cigarettes     Quit date: 1/1/1983    Smokeless tobacco: Never Used    Alcohol use No    Drug use: No    Sexual activity: Not on file     Other Topics Concern    Not on file     Social History Narrative       Review of Systems   Constitutional: Negative for chills, fever and malaise/fatigue. Genitourinary: Negative for dysuria, flank pain, hematuria and urgency. Visit Vitals    /83 (BP 1 Location: Right arm, BP Patient Position: Sitting)    Pulse 96    Temp 97.4 °F (36.3 °C) (Oral)    Resp 18    Ht 5' 8.5\" (1.74 m)    Wt 237 lb 12.8 oz (107.9 kg)    SpO2 97%    BMI 35.63 kg/m2       Physical Exam   Constitutional: He is oriented to person, place, and time. He appears well-developed and well-nourished. No distress. HENT:   Head: Normocephalic and atraumatic. Right Ear: External ear normal.   Left Ear: External ear normal.   Abdominal: There is no CVA tenderness. Neurological: He is alert and oriented to person, place, and time. Skin: He is not diaphoretic. Psychiatric: He has a normal mood and affect. His behavior is normal.   Nursing note and vitals reviewed.       Results for orders placed or performed in visit on 05/16/18   AMB POC URINALYSIS DIP STICK AUTO W/O MICRO     Status: None   Result Value Ref Range Status    Color (UA POC) Dark Yellow  Final    Clarity (UA POC) Slightly Cloudy  Final    Glucose (UA POC) Negative Negative Final    Bilirubin (UA POC) Negative Negative Final    Ketones (UA POC) Negative Negative Final    Specific gravity (UA POC) 1.020 1.001 - 1.035 Final    Blood (UA POC) Trace Negative Final    pH (UA POC) 6.5 4.6 - 8.0 Final    Protein (UA POC) Negative Negative Final    Urobilinogen (UA POC) 0.2 mg/dL 0.2 - 1 Final    Nitrites (UA POC) Negative Negative Final    Leukocyte esterase (UA POC) Negative Negative Final   Results for orders placed or performed in visit on 07/24/17   AMB POC URINALYSIS DIP STICK AUTO W/ MICRO     Status: None   Result Value Ref Range Status    Color (UA POC) Dark Yellow  Final    Clarity (UA POC) Clear  Final    Glucose (UA POC) Negative Negative Final    Bilirubin (UA POC) Negative Negative Final    Ketones (UA POC) Negative Negative Final    Specific gravity (UA POC) 1.020 1.001 - 1.035 Final    Blood (UA POC) Negative Negative Final    pH (UA POC) 6.5 4.6 - 8.0 Final    Protein (UA POC) Negative Negative mg/dL Final    Urobilinogen (UA POC) 0.2 mg/dL 0.2 - 1 Final    Nitrites (UA POC) Negative Negative Final    Leukocyte esterase (UA POC) Negative Negative Final                 Assesment:    ICD-10-CM ICD-9-CM    1. Renal stone N20.0 592.0 AMB POC URINALYSIS DIP STICK AUTO W/O MICRO   2. Gross hematuria R31.0 599.71 AMB POC URINALYSIS DIP STICK AUTO W/O MICRO     1. Continue with increased fluid intake. UA with trace blood otherwise normal. Patient asymptomatic. Will collect record from [id number] [address] [facility]    2. Gross hematuria has resolved. Likely secondary to# 1. I have discussed the diagnosis with the patient and the intended management  The patient has received an after-visit summary and questions were answered concerning future plans. I have discussed medication usage, side effects and warnings with the patient as well. I have reviewed the plan of care with the patient, accepted their input and they are in agreement with the treatment goals. Follow-up Disposition:  Return for Annual exam 8/24 or after.       Yonas Junior MD                .

## 2018-05-16 NOTE — PROGRESS NOTES
Mariela Portillo is a 78 y.o. male here today for a follow up on kidney stones. He went to [facility] last week and had an ultrasound and CT scan which showed he has kidney stones. Patient denies any pain this morning and is unsure if he has passed the stones yet or not. Learning assessment previously completed. 1. Have you been to the ER, urgent care clinic or hospitalized since your last visit?  [facility]

## 2018-05-16 NOTE — MR AVS SNAPSHOT
[address] [place] [address] [address] [address] 
[phone] Patient: Isabel Neal MRN: [id number] Visit Information Date & Time Provider Department Dept. Phone Encounter #  
 5/16/2018 10:00 AM Nathaly Potts, [address] [place] [phone] [id number] Follow-up Instructions Return for Annual exam 8/24 or after. Upcoming Health Maintenance Date Due DTaP/Tdap/Td series (1 - Tdap) 11/14/1963 Influenza Age 5 to Adult 8/1/2018 MEDICARE YEARLY EXAM 8/25/2018 GLAUCOMA SCREENING Q2Y 7/28/2019 Allergies as of 5/16/2018  Review Complete On: 5/16/2018 By: Nathaly Potts MD  
  
 Severity Noted Reaction Type Reactions Niacin  01/11/2009    Other (comments) Other reaction(s): rash/hot flash Current Immunizations  Reviewed on 8/24/2017 Name Date Influenza High Dose Vaccine PF 8/24/2017  2:18 PM, 9/30/2016, 10/9/2015, 9/18/2014, 9/10/2013, 11/6/2012 Influenza Vaccine 11/8/2011, 9/24/2010, 10/13/2009, 10/28/2008 Pneumococcal Conjugate (PCV-13) 1/26/2015 Pneumococcal Polysaccharide (PPSV-23) 9/10/2013, 12/14/2006 Zoster Vaccine, Live 2/23/2009 Not reviewed this visit You Were Diagnosed With   
  
 Codes Comments Renal stone    -  Primary ICD-10-CM: N20.0 ICD-9-CM: 592.0 Gross hematuria     ICD-10-CM: R31.0 ICD-9-CM: 599.71 Vitals BP Pulse Temp Resp Height(growth percentile) Weight(growth percentile) 109/83 (BP 1 Location: Right arm, BP Patient Position: Sitting) 96 97.4 °F (36.3 °C) (Oral) 18 5' 8.5\" (1.74 m) 237 lb 12.8 oz (107.9 kg) SpO2 BMI Smoking Status 97% 35.63 kg/m2 Former Smoker Vitals History BMI and BSA Data Body Mass Index Body Surface Area  
 35.63 kg/m 2 2.28 m 2 Preferred Pharmacy Pharmacy Name Phone [address] [phone] Your Updated Medication List  
  
   
This list is accurate as of 5/16/18 10:41 AM.  Always use your most recent med list.  
  
  
  
  
 aspirin delayed-release 81 mg tablet Take 81 mg by mouth. CALTRATE 600 PO Take 600 mg by mouth. DARATUMUMAB IV  
6,880 mg by IntraVENous route every four (4) weeks. guaiFENesin  mg ER tablet Commonly known as:  [employer] Take 1 Tab by mouth two (2) times a day. methylPREDNISolone 16 mg tablet Commonly known as:  MEDROL Take 16 mg by mouth.  
  
 multivitamin tablet Commonly known as:  ONE A DAY Take 1 Tab by mouth.  
  
 polyethylene glycol 17 gram/dose powder Commonly known as:  Cyrilla Brash Take  by mouth. SYNTHROID 125 mcg tablet Generic drug:  levothyroxine Indications: Underactive Thyroid. 1 PO once a day SYSTANE ULTRA OP Apply  to eye. TYLENOL EXTRA STRENGTH PO Take 1 Tab by mouth. valACYclovir 500 mg tablet Commonly known as:  VALTREX Take 500 mg by mouth three (3) days a week. We Performed the Following AMB POC URINALYSIS DIP STICK AUTO W/O MICRO [39378 CPT(R)] Follow-up Instructions Return for Annual exam 8/24 or after. Patient Instructions Make sure to drink plenty of water. Learning About Diet for Kidney Stone Prevention What are kidney stones? Kidney stones are made of salts and minerals in the urine that form small \"kinjal. \" Stones can form in the kidneys and the ureters (the tubes that lead from the kidneys to the bladder). They can also form in the bladder. Stones may not cause a problem as long as they stay in the kidneys. But they can cause sudden, severe pain. Pain is most likely when the stones travel from the kidneys to the bladder. Kidney stones can cause bloody urine. Kidney stones often run in families.  You are more likely to get them if you don't drink enough fluids, mainly water. Certain foods and drinks and some dietary supplements may also increase your risk for kidney stones if you consume too much of them. What can you do to prevent kidney stones? Changing what you eat may not prevent all types of kidney stones. But for people who have a history of certain kinds of kidney stones, some changes in diet may help. A dietitian can help you set up a meal plan that includes healthy, low-oxalate choices. Here are some general guidelines to get you started. Plan your meals and snacks around foods that are low in oxalate. These foods include: · Corn, kale, parsnips, and squash,. · Beef, chicken, pork, turkey, and fish. · Milk, butter, cheese, and yogurt. You can eat certain foods that are medium-high in oxalate, but eat them only once in a while. These foods include: · Bread. · Brown rice. · English muffins. · Figs. · Popcorn. · String beans. · Tomatoes. Limit very high-oxalate foods, including: · Black tea. · Coffee. · Chocolate. · Dark green vegetables. · Nuts. Here are some other things you can do to help prevent kidney stones. · Drink plenty of fluids. If you have kidney, heart, or liver disease and have to limit fluids, talk with your doctor before you increase the amount of fluids you drink. · Do not take more than the recommended daily dose of vitamins C and D. 
· Limit the salt in your diet. · Eat a balanced diet that is not too high in protein. Follow-up care is a key part of your treatment and safety. Be sure to make and go to all appointments, and call your doctor if you are having problems. It's also a good idea to know your test results and keep a list of the medicines you take. Where can you learn more? Go to http://marga-radha.info/. Enter C138 in the search box to learn more about \"Learning About Diet for Kidney Stone Prevention. \" Current as of: May 12, 2017 Content Version: 11.4 © 2380-6090 Healthwise, Incorporated. Care instructions adapted under license by [employer] (which disclaims liability or warranty for this information). If you have questions about a medical condition or this instruction, always ask your healthcare professional. [address] any warranty or liability for your use of this information. Introducing [facility]! Dear Phuong Boo: Thank you for requesting a Trusper account. Our records indicate that you already have an active Trusper account. You can access your account anytime at [email or web address] Did you know that you can access your hospital and ER discharge instructions at any time in Trusper? You can also review all of your test results from your hospital stay or ER visit. Additional Information If you have questions, please visit the Frequently Asked Questions section of the Trusper website at [email or web address] Remember, Trusper is NOT to be used for urgent needs. For medical emergencies, dial 911. Now available from your iPhone and Android! Please provide this summary of care documentation to your next provider. Your primary care clinician is listed as Cabrera Elizabeth. If you have any questions after today's visit, please call [phone].

## 2018-05-16 NOTE — PATIENT INSTRUCTIONS
Make sure to drink plenty of water. Learning About Diet for Kidney Stone Prevention  What are kidney stones? Kidney stones are made of salts and minerals in the urine that form small \"kinjal. \" Stones can form in the kidneys and the ureters (the tubes that lead from the kidneys to the bladder). They can also form in the bladder. Stones may not cause a problem as long as they stay in the kidneys. But they can cause sudden, severe pain. Pain is most likely when the stones travel from the kidneys to the bladder. Kidney stones can cause bloody urine. Kidney stones often run in families. You are more likely to get them if you don't drink enough fluids, mainly water. Certain foods and drinks and some dietary supplements may also increase your risk for kidney stones if you consume too much of them. What can you do to prevent kidney stones? Changing what you eat may not prevent all types of kidney stones. But for people who have a history of certain kinds of kidney stones, some changes in diet may help. A dietitian can help you set up a meal plan that includes healthy, low-oxalate choices. Here are some general guidelines to get you started. Plan your meals and snacks around foods that are low in oxalate. These foods include:  · Corn, kale, parsnips, and squash,. · Beef, chicken, pork, turkey, and fish. · Milk, butter, cheese, and yogurt. You can eat certain foods that are medium-high in oxalate, but eat them only once in a while. These foods include:  · Bread. · Brown rice. · English muffins. · Figs. · Popcorn. · String beans. · Tomatoes. Limit very high-oxalate foods, including:  · Black tea. · Coffee. · Chocolate. · Dark green vegetables. · Nuts. Here are some other things you can do to help prevent kidney stones. · Drink plenty of fluids. If you have kidney, heart, or liver disease and have to limit fluids, talk with your doctor before you increase the amount of fluids you drink.   · Do not take more than the recommended daily dose of vitamins C and D.  · Limit the salt in your diet. · Eat a balanced diet that is not too high in protein. Follow-up care is a key part of your treatment and safety. Be sure to make and go to all appointments, and call your doctor if you are having problems. It's also a good idea to know your test results and keep a list of the medicines you take. Where can you learn more? Go to http://marga-radha.info/. Enter C138 in the search box to learn more about \"Learning About Diet for Kidney Stone Prevention. \"  Current as of: May 12, 2017  Content Version: 11.4  © 8534-2588 Healthwise, UsingMiles. Care instructions adapted under license by [employer] (which disclaims liability or warranty for this information). If you have questions about a medical condition or this instruction, always ask your healthcare professional. [address] any warranty or liability for your use of this information.

## 2018-09-06 ENCOUNTER — OFFICE VISIT (OUTPATIENT)
Dept: FAMILY MEDICINE CLINIC | Age: 78
End: 2018-09-06

## 2018-09-06 VITALS
OXYGEN SATURATION: 97 % | WEIGHT: 235 LBS | SYSTOLIC BLOOD PRESSURE: 130 MMHG | BODY MASS INDEX: 34.8 KG/M2 | HEIGHT: 69 IN | TEMPERATURE: 97.8 F | RESPIRATION RATE: 18 BRPM | HEART RATE: 86 BPM | DIASTOLIC BLOOD PRESSURE: 90 MMHG

## 2018-09-06 DIAGNOSIS — Z13.31 SCREENING FOR DEPRESSION: ICD-10-CM

## 2018-09-06 DIAGNOSIS — Z71.89 ADVANCED DIRECTIVES, COUNSELING/DISCUSSION: ICD-10-CM

## 2018-09-06 DIAGNOSIS — Z23 ENCOUNTER FOR IMMUNIZATION: ICD-10-CM

## 2018-09-06 DIAGNOSIS — Z00.00 MEDICARE ANNUAL WELLNESS VISIT, SUBSEQUENT: Primary | ICD-10-CM

## 2018-09-06 DIAGNOSIS — Z13.39 SCREENING FOR ALCOHOLISM: ICD-10-CM

## 2018-09-06 NOTE — PATIENT INSTRUCTIONS
Medicare Wellness Visit, Male The best way to live healthy is to have a lifestyle where you eat a well-balanced diet, exercise regularly, limit alcohol use, and quit all forms of tobacco/nicotine, if applicable. Regular preventive services are another way to keep healthy. Preventive services (vaccines, screening tests, monitoring & exams) can help personalize your care plan, which helps you manage your own care. Screening tests can find health problems at the earliest stages, when they are easiest to treat. [address] [facility] follows the current, evidence-based guidelines published by the [facility] ([facility]) when recommending preventive services for our patients. Because we follow these guidelines, sometimes recommendations change over time as research supports it. (For example, a prostate screening blood test is no longer routinely recommended for men with no symptoms.) Of course, you and your doctor may decide to screen more often for some diseases, based on your risk and co-morbidities (chronic disease you are already diagnosed with). Preventive services for you include: - Medicare offers their members a free annual wellness visit, which is time for you and your primary care provider to discuss and plan for your preventive service needs. Take advantage of this benefit every year! 
-All adults over age 72 should receive the recommended pneumonia vaccines. Current USPSTF guidelines recommend a series of two vaccines for the best pneumonia protection.  
-All adults should have a flu vaccine yearly and an ECG.  All adults age 61 and older should receive a shingles vaccine once in their lifetime.   
-All adults age 38-68 who are overweight should have a diabetes screening test once every three years.  
-Other screening tests & preventive services for persons with diabetes include: an eye exam to screen for diabetic retinopathy, a kidney function test, a foot exam, and stricter control over your cholesterol.  
-Cardiovascular screening for adults with routine risk involves an electrocardiogram (ECG) at intervals determined by the provider.  
-Colorectal cancer screening should be done for adults age 54-65 with no increased risk factors for colorectal cancer. There are a number of acceptable methods of screening for this type of cancer. Each test has its own benefits and drawbacks. Discuss with your provider what is most appropriate for you during your annual wellness visit. The different tests include: colonoscopy (considered the best screening method), a fecal occult blood test, a fecal DNA test, and sigmoidoscopy. 
-All adults born between [facility] should be screened once for Hepatitis C. 
-An Abdominal Aortic Aneurysm (AAA) Screening is recommended for men age 73-68 who has ever smoked in their lifetime. Here is a list of your current Health Maintenance items (your personalized list of preventive services) with a due date: 
Health Maintenance Due Topic Date Due  
 DTaP/Tdap/Td  (1 - Tdap) 11/14/1963  Flu Vaccine  08/01/2018 [facility] Annual Well Visit  08/25/2018

## 2018-09-06 NOTE — PROGRESS NOTES
Maldonado Aviles is a 78 y.o. male who presents for routine immunizations. He denies any symptoms , reactions or allergies that would exclude them from being immunized today. Risks and adverse reactions were discussed and the VIS was given to them. All questions were addressed. He was observed for 15 min post injection. There were no reactions observed.  
 
Matthew Medrano LPN

## 2018-09-06 NOTE — PROGRESS NOTES
Xena Romo is a 78 y.o. male here for [address] C/o bilat wrist pain. Fall Risk Assessment, last 12 mths 9/6/2018 Able to walk? Yes Fall in past 12 months? No  
Fall with injury? -  
Number of falls in past 12 months - Fall Risk Score -  
 
PHQ over the last two weeks 9/6/2018 Little interest or pleasure in doing things Not at all Feeling down, depressed, irritable, or hopeless Not at all Total Score PHQ 2 0 Abuse Screening Questionnaire 9/6/2018 Do you ever feel afraid of your partner? Coty Chinedu Are you in a relationship with someone who physically or mentally threatens you? Coty Chinedu Is it safe for you to go home? Y  
 

## 2018-09-06 NOTE — ACP (ADVANCE CARE PLANNING)
Advance Care Planning    Advance Care Planning (ACP) Provider Note - Comprehensive     Date of ACP Conversation: 9/6/2018  Persons included in Conversation:  patient and family  Length of ACP Conversation in minutes:  16 minutes    Authorized Decision Maker (if patient is incapable of making informed decisions): This person is:  Healthcare Agent/Medical Power of  under Advance Directive          General ACP for ALL Patients with Decision Making Capacity:   Importance of advance care planning, including choosing a healthcare agent to communicate patient's healthcare decisions if patient lost the ability to make decisions, such as after a sudden illness or accident    Review of Existing Advance Directive:  N/A    For Serious or Chronic Illness:  Understanding of medical condition    Understanding of CPR, goals and expected outcomes, benefits and burdens discussed.     Interventions Provided:  Recommended completion of Advance Directive form after review of ACP materials and conversation with prospective healthcare agent

## 2018-09-06 NOTE — PROGRESS NOTES
This is the Subsequent Medicare Annual Wellness Exam, performed 12 months or more after the Initial AWV or the last Subsequent AWV I have reviewed the patient's medical history in detail and updated the computerized patient record. History Past Medical History:  
Diagnosis Date  Arthritis  Back pain  Calculus of kidney  Multiple myeloma ([facility] [address] 2010 Past Surgical History:  
Procedure Laterality Date  HX CATARACT REMOVAL Bilateral 2017  HX THYROIDECTOMY Current Outpatient Prescriptions Medication Sig Dispense Refill  DARATUMUMAB IV 6,880 mg by IntraVENous route every four (4) weeks.  guaiFENesin ER (MUCINEX) 600 mg ER tablet Take 1 Tab by mouth two (2) times a day. 30 Tab 0  
 methylPREDNISolone (MEDROL) 16 mg tablet Take 16 mg by mouth.  valACYclovir (VALTREX) 500 mg tablet Take 500 mg by mouth three (3) days a week.  aspirin delayed-release 81 mg tablet Take 81 mg by mouth.  CALCIUM CARBONATE (CALTRATE 600 PO) Take 600 mg by mouth.  multivitamin (ONE A DAY) tablet Take 1 Tab by mouth.  PROPYLENE GLYCOL/ (SYSTANE ULTRA OP) Apply  to eye.  polyethylene glycol (MIRALAX) 17 gram/dose powder Take  by mouth.  levothyroxine (SYNTHROID) 125 mcg tablet Indications: Underactive Thyroid. 1 PO once a day  ACETAMINOPHEN (TYLENOL EXTRA STRENGTH PO) Take 1 Tab by mouth. Allergies Allergen Reactions  Niacin Other (comments) Other reaction(s): rash/hot flash Family History Problem Relation Age of Onset  Heart Disease Father  Heart Attack Father  Arthritis-osteo Sister  Asthma Maternal Grandmother  Bleeding Prob Maternal Grandmother  Lung Disease Sister Social History Substance Use Topics  Smoking status: Former Smoker Types: Cigarettes Quit date: 1/1/1983  Smokeless tobacco: Never Used  Alcohol use No  
 
Patient Active Problem List  
Diagnosis Code  Acute low back pain without sciatica M54.5  Urinary frequency R35.0  AMANDEEP (obstructive sleep apnea) G47.33  
 Multiple myeloma in remission (HCC) C90.01  Severe obesity (BMI 35.0-39. 9) with comorbidity ([facility] [address] E66.01 Depression Risk Factor Screening: PHQ over the last two weeks 9/6/2018 Little interest or pleasure in doing things Not at all Feeling down, depressed, irritable, or hopeless Not at all Total Score PHQ 2 0 Alcohol Risk Factor Screening: You do not drink alcohol or very rarely. Functional Ability and Level of Safety:  
Hearing Loss Hearing is good. Activities of Daily Living The home contains: no safety equipment. Patient does total self care Fall Risk Fall Risk Assessment, last 12 mths 9/6/2018 Able to walk? Yes Fall in past 12 months? No  
Fall with injury? -  
Number of falls in past 12 months - Fall Risk Score -  
 
 
Abuse Screen Patient is not abused Cognitive Screening Evaluation of Cognitive Function: 
Has your family/caregiver stated any concerns about your memory: no 
Normal 
 
Patient Care Team  
Patient Care Team: 
Suzy Gonzalez MD as PCP - General (Family Practice) Jodie Dance, MD as Physician [facility]) Chris Orellana MD (Internal Medicine) Assessment/Plan Education and counseling provided: 
Are appropriate based on today's review and evaluation End-of-Life planning (with patient's consent) Diagnoses and all orders for this visit: 1. Advanced directives, counseling/discussion -     ADVANCE CARE PLANNING FIRST 30 MINS 2. Screening for alcoholism -     Annual  Alcohol Screen 15 min () 3. Screening for depression -     Depression Screen Annual 
 
4. Encounter for immunization -     Influenza Admin () -     Influenza virus vaccine (FLUZONE HIGH DOSE) PF ([id number]) Health Maintenance Due Topic Date Due  
 DTaP/Tdap/Td series (1 - Tdap) 11/14/1963  Influenza Age 5 to Adult  08/01/2018  MEDICARE YEARLY EXAM  08/25/2018

## 2018-11-26 ENCOUNTER — OFFICE VISIT (OUTPATIENT)
Dept: FAMILY MEDICINE CLINIC | Age: 78
End: 2018-11-26

## 2018-11-26 VITALS
OXYGEN SATURATION: 97 % | HEART RATE: 96 BPM | SYSTOLIC BLOOD PRESSURE: 114 MMHG | WEIGHT: 240.8 LBS | BODY MASS INDEX: 35.66 KG/M2 | TEMPERATURE: 97.9 F | HEIGHT: 69 IN | RESPIRATION RATE: 18 BRPM | DIASTOLIC BLOOD PRESSURE: 87 MMHG

## 2018-11-26 DIAGNOSIS — R06.02 SHORTNESS OF BREATH: Primary | ICD-10-CM

## 2018-11-26 DIAGNOSIS — C90.01 MULTIPLE MYELOMA IN REMISSION (HCC): ICD-10-CM

## 2018-11-26 NOTE — ASSESSMENT & PLAN NOTE
Key Oncology Meds             DARATUMUMAB IV 6,880 mg by IntraVENous route every four (4) weeks. Key Pain Meds             ACETAMINOPHEN (TYLENOL EXTRA STRENGTH PO) Take 1 Tab by mouth.         No results found for: WBC, WBCT, WBCPOC, ANEU, HGB, HGBPOC, HCT, HCTPOC, PLT, PLTPOC, CREA, CREAPOC, CREATEXT, BUN, IBUN, BUNPOC, GPT, ALTPOC, ALT, SGOT, ASTPOC, ALB, ALBPOC, PREALB, PSA, PSA2, [id number], [id number], PSALT, HGBEXT, HCTEXT, PLTEXT

## 2018-11-26 NOTE — PROGRESS NOTES
Patricia Marroquin is a 70 y.o. male here today with c/o shortness of breath he has had for a couple of years.

## 2018-11-26 NOTE — PATIENT INSTRUCTIONS
Shortness of Breath: Care Instructions  Your Care Instructions  Shortness of breath has many causes. Sometimes conditions such as anxiety can lead to shortness of breath. Some people get mild shortness of breath when they exercise. Trouble breathing also can be a symptom of a serious problem, such as asthma, lung disease, emphysema, heart problems, and pneumonia. If your shortness of breath continues, you may need tests and treatment. Watch for any changes in your breathing and other symptoms. Follow-up care is a key part of your treatment and safety. Be sure to make and go to all appointments, and call your doctor if you are having problems. It's also a good idea to know your test results and keep a list of the medicines you take. How can you care for yourself at home? · Do not smoke or allow others to smoke around you. If you need help quitting, talk to your doctor about stop-smoking programs and medicines. These can increase your chances of quitting for good. · Get plenty of rest and sleep. · Take your medicines exactly as prescribed. Call your doctor if you think you are having a problem with your medicine. · Find healthy ways to deal with stress. ? Exercise daily. ? Get plenty of sleep. ? Eat regularly and well. When should you call for help? Call 911 anytime you think you may need emergency care. For example, call if:    · You have severe shortness of breath.     · You have symptoms of a heart attack. These may include:  ? Chest pain or pressure, or a strange feeling in the chest.  ? Sweating. ? Shortness of breath. ? Nausea or vomiting. ? Pain, pressure, or a strange feeling in the back, neck, jaw, or upper belly or in one or both shoulders or arms. ? Lightheadedness or sudden weakness. ? A fast or irregular heartbeat. After you call 911, the  may tell you to chew 1 adult-strength or 2 to 4 low-dose aspirin. Wait for an ambulance.  Do not try to drive yourself.    Call your doctor now or seek immediate medical care if:    · Your shortness of breath gets worse or you start to wheeze. Wheezing is a high-pitched sound when you breathe.     · You wake up at night out of breath or have to prop your head up on several pillows to breathe.     · You are short of breath after only light activity or while at rest.    Watch closely for changes in your health, and be sure to contact your doctor if:    · You do not get better over the next 1 to 2 days. Where can you learn more? Go to http://marga-radha.info/. Enter S780 in the search box to learn more about \"Shortness of Breath: Care Instructions. \"  Current as of: December 6, 2017  Content Version: 11.8  © 8800-9439 [employer]. Care instructions adapted under license by [employer] (which disclaims liability or warranty for this information). If you have questions about a medical condition or this instruction, always ask your healthcare professional. [address] any warranty or liability for your use of this information.

## 2018-11-26 NOTE — Clinical Note
Can you check with oncologist office, Dr Jennifer Lyles, to see if any recent thyroid function tests have been done? Thanks.

## 2018-11-29 NOTE — PROGRESS NOTES
Shortness of Breath        HPI: Jessica Pratt is a 70 y.o. male [facility] doing well today. He does report he gets markedly short of breath with extensive activity. For example if he is recurrently lifting grocery bags or bending over to do work he may experience shortness of breath. His resolves as soon as activity is over. He denies any chest pain with this. He can go for short walks and climb up stairs without any feelings of shortness of breath. He denies any shortness of breath with laying down. He does have remote history of smoking. Smoked for approximately 30 years 1-2 packs daily. He has quit since 1983. He had pulmonary function tests completed roughly 2 years ago which were normal.    He does have history of multiple myeloma currently in remission. He he sees his oncologist, Dr. Iam Pearl regularly. He had recent PET scan which did not demonstrate active disease. Past Medical History:   Diagnosis Date    Arthritis     Back pain     Calculus of kidney     Multiple myeloma ([facility] [address] 2010       Current Outpatient Medications   Medication Sig    DARATUMUMAB IV 6,880 mg by IntraVENous route every four (4) weeks.  guaiFENesin ER (MUCINEX) 600 mg ER tablet Take 1 Tab by mouth two (2) times a day.  methylPREDNISolone (MEDROL) 16 mg tablet Take 16 mg by mouth.  valACYclovir (VALTREX) 500 mg tablet Take 500 mg by mouth three (3) days a week.  aspirin delayed-release 81 mg tablet Take 81 mg by mouth.  CALCIUM CARBONATE (CALTRATE 600 PO) Take 600 mg by mouth.  multivitamin (ONE A DAY) tablet Take 1 Tab by mouth.  PROPYLENE GLYCOL/ (SYSTANE ULTRA OP) Apply  to eye.  polyethylene glycol (MIRALAX) 17 gram/dose powder Take  by mouth.  levothyroxine (SYNTHROID) 125 mcg tablet Indications: Underactive Thyroid. 1 PO once a day    ACETAMINOPHEN (TYLENOL EXTRA STRENGTH PO) Take 1 Tab by mouth. No current facility-administered medications for this visit. Allergies   Allergen Reactions    Niacin Other (comments)     Other reaction(s): rash/hot flash       Past Surgical History:   Procedure Laterality Date    HX CATARACT REMOVAL Bilateral 2017    HX THYROIDECTOMY         Family History   Problem Relation Age of Onset    Heart Disease Father     Heart Attack Father     Arthritis-osteo Sister     Asthma Maternal Grandmother     Bleeding Prob Maternal Grandmother     Lung Disease Sister        Social History     Socioeconomic History    Marital status:      Spouse name: Not on file    Number of children: Not on file    Years of education: Not on file    Highest education level: Not on file   Social Needs    Financial resource strain: Not on file    Food insecurity - worry: Not on file    Food insecurity - inability: Not on file   [place] needs - medical: Not on file   [place] needs - non-medical: Not on file   Occupational History    Not on file   Tobacco Use    Smoking status: Former Smoker     Types: Cigarettes     Last attempt to quit: 1983     Years since quittin.9    Smokeless tobacco: Never Used   Substance and Sexual Activity    Alcohol use: No    Drug use: No    Sexual activity: Not on file   Other Topics Concern    Not on file   Social History Narrative    Not on file       Review of Systems   Constitutional: Negative for chills, fever, malaise/fatigue and weight loss. Respiratory: Negative for cough, sputum production and wheezing. Cardiovascular: Negative for chest pain, palpitations, orthopnea and claudication. Gastrointestinal: Negative for abdominal pain. Visit Vitals  /87 (BP 1 Location: Left arm, BP Patient Position: Sitting)   Pulse 96   Temp 97.9 °F (36.6 °C) (Oral)   Resp 18   Ht 5' 8.5\" (1.74 m)   Wt 240 lb 12.8 oz (109.2 kg)   SpO2 97%   BMI 36.08 kg/m²       Physical Exam   Constitutional: He is oriented to person, place, and time.  He appears well-developed and well-nourished. No distress. HENT:   Head: Normocephalic and atraumatic. Right Ear: External ear normal.   Left Ear: External ear normal.   Cardiovascular: Normal rate, regular rhythm and normal heart sounds. Pulmonary/Chest: Effort normal and breath sounds normal. No respiratory distress. He has no wheezes. He has no rales. Neurological: He is alert and oriented to person, place, and time. No cranial nerve deficit. Skin: Skin is warm and dry. Capillary refill takes less than 2 seconds. He is not diaphoretic. Psychiatric: He has a normal mood and affect. His behavior is normal.         Assesment:  Diagnoses and all orders for this visit:    1. Shortness of breath  -     PULMONARY FUNCTION TEST; Future    2. Multiple myeloma in remission [facility])  Assessment & Plan:    Key Oncology Meds             DARATUMUMAB IV 6,880 mg by IntraVENous route every four (4) weeks. Key Pain Meds             ACETAMINOPHEN (TYLENOL EXTRA STRENGTH PO) Take 1 Tab by mouth. No results found for: WBC, WBCT, WBCPOC, ANEU, HGB, HGBPOC, HCT, HCTPOC, PLT, PLTPOC, CREA, CREAPOC, CREATEXT, BUN, IBUN, BUNPOC, GPT, ALTPOC, ALT, SGOT, ASTPOC, ALB, ALBPOC, PREALB, PSA, PSA2, [id number], [id number], PSALT, HGBEXT, HCTEXT, PLTEXT       Due to smoking history we will obtain pulmonary function test.  Unclear as to cause of feelings of shortness of breath perhaps exercise intolerance. He does not have a seem to have any cardiac symptoms however we do need to keep this in mind. Multiple myeloma currently in remission. Sees his oncologist regularly. He does not have any symptoms of fatigue   Or weight loss    I have discussed the diagnosis with the patient and the intended management  The patient has received an after-visit summary and questions were answered concerning future plans. I have discussed medication usage, side effects and warnings with the patient as well. I have reviewed the plan of care with the patient, accepted their input and they are in agreement with the treatment goals.          Follow-up Disposition:  Return for after lung function test.      Raúl Kelly MD

## 2018-12-11 DIAGNOSIS — E89.0 POST-SURGICAL HYPOTHYROIDISM: Primary | ICD-10-CM

## 2018-12-13 ENCOUNTER — TELEPHONE (OUTPATIENT)
Dept: FAMILY MEDICINE CLINIC | Age: 78
End: 2018-12-13

## 2018-12-13 NOTE — TELEPHONE ENCOUNTER
Called pt to give him PFT results per Dr. Alex Mccauley:    Melissa Pollack let Mr. Brady know that his pulmonary function tests were relatively normal.  There were no signs of obstructive or restrictive disease.  He did have a slightly reduced oxygen diffusion capacity which means that oxygen is not going from his lungs to his bloodstream that well.  This can be due to a few reasons 1 of them is high blood pressure within the pulmonary system.  This reduction in diffusion capacity may or may not be causing his feelings of shortness of breath.  In order to evaluate this we would need to get a echocardiogram.  It would also evaluate his heart function because this may be causing his shortness of breath as well.  Other causes of decreased diffusion capacity include things like pulmonary fibrosis.  I think we should start with echocardiogram and if he would like we would then obtain a lung CT to make sure that the lungs themselves look good from an anatomical standpoint. \"    Pt voiced understanding and agrees to additional testing

## 2018-12-18 DIAGNOSIS — R94.2 ABNORMAL DIFFUSION CAPACITY DETERMINED BY PULMONARY FUNCTION TEST: ICD-10-CM

## 2018-12-18 DIAGNOSIS — R06.02 SHORTNESS OF BREATH: Primary | ICD-10-CM

## 2018-12-18 NOTE — PROGRESS NOTES
Patient with abnormal PFT demonstrating mildly decreased diffusion capacity. Also has reports of shortness of breath.   At this point it would be worthwhile to evaluate for pulmonary artery hypertension with echocardiogram.    Jackie Bell MD